# Patient Record
Sex: FEMALE | Race: WHITE | NOT HISPANIC OR LATINO | Employment: FULL TIME | URBAN - METROPOLITAN AREA
[De-identification: names, ages, dates, MRNs, and addresses within clinical notes are randomized per-mention and may not be internally consistent; named-entity substitution may affect disease eponyms.]

---

## 2021-07-26 ENCOUNTER — OFFICE VISIT (OUTPATIENT)
Dept: UROLOGY | Facility: CLINIC | Age: 51
End: 2021-07-26

## 2021-07-26 VITALS — RESPIRATION RATE: 17 BRPM | BODY MASS INDEX: 33.38 KG/M2 | WEIGHT: 170 LBS | HEIGHT: 60 IN

## 2021-07-26 DIAGNOSIS — R31.9 URINARY TRACT INFECTION WITH HEMATURIA, SITE UNSPECIFIED: Primary | ICD-10-CM

## 2021-07-26 DIAGNOSIS — R31.29 MICROSCOPIC HEMATURIA: ICD-10-CM

## 2021-07-26 DIAGNOSIS — N39.0 URINARY TRACT INFECTION WITH HEMATURIA, SITE UNSPECIFIED: Primary | ICD-10-CM

## 2021-07-26 LAB
BILIRUB BLD-MCNC: NEGATIVE MG/DL
CLARITY, POC: CLEAR
COLOR UR: YELLOW
GLUCOSE UR STRIP-MCNC: NEGATIVE MG/DL
KETONES UR QL: NEGATIVE
LEUKOCYTE EST, POC: NEGATIVE
NITRITE UR-MCNC: NEGATIVE MG/ML
PH UR: 8 [PH] (ref 5–8)
PROT UR STRIP-MCNC: NEGATIVE MG/DL
RBC # UR STRIP: ABNORMAL /UL
SP GR UR: 1.01 (ref 1–1.03)
UROBILINOGEN UR QL: NORMAL

## 2021-07-26 PROCEDURE — 81003 URINALYSIS AUTO W/O SCOPE: CPT | Performed by: NURSE PRACTITIONER

## 2021-07-26 PROCEDURE — 87086 URINE CULTURE/COLONY COUNT: CPT | Performed by: NURSE PRACTITIONER

## 2021-07-26 PROCEDURE — 99203 OFFICE O/P NEW LOW 30 MIN: CPT | Performed by: NURSE PRACTITIONER

## 2021-07-26 RX ORDER — OXYBUTYNIN CHLORIDE 5 MG/1
10 TABLET, EXTENDED RELEASE ORAL DAILY
Qty: 30 TABLET | Refills: 11 | Status: SHIPPED | OUTPATIENT
Start: 2021-07-26 | End: 2021-08-27

## 2021-07-26 RX ORDER — SULFAMETHOXAZOLE AND TRIMETHOPRIM 800; 160 MG/1; MG/1
TABLET ORAL
COMMUNITY
Start: 2021-07-07 | End: 2021-08-27

## 2021-07-27 LAB — BACTERIA SPEC AEROBE CULT: NORMAL

## 2021-07-27 NOTE — PROGRESS NOTES
"Chief Complaint  No chief complaint on file.    Subjective          Rosalina Singh presents to [facility]  Patient is a 51-year-old white/ female presents to the urology office with complaints of microscopic materia recurrent UTIs.    Patient admits to urinary frequency and urgency.  Denies any dysuria.    Denies gross hematuria.    Admits to being told that she has had blood in her urine for several years.    Admits to left flank pain.    Admits to some incontinence issues.    Denies any blood thinners.    Admits to being a smoker 15+ years.    Results for orders placed or performed in visit on 07/26/21  -POC Urinalysis Dipstick  Specimen: Urine       Result                      Value             Ref Range           Color                       Yellow            Yellow, Stra*       Clarity, UA                 Clear             Clear               Glucose, UA                 Negative          Negative, 10*       Bilirubin                   Negative          Negative            Ketones, UA                 Negative          Negative            Specific Gravity            1.015             1.005 - 1.030       Blood, UA                   Trace (A)         Negative            pH, Urine                   8.0               5.0 - 8.0           Protein, POC                Negative          Negative mg/*       Urobilinogen, UA            Normal            Normal              Leukocytes                  Negative          Negative            Nitrite, UA                 Negative          Negative           7/21 Renal u/s reveals: normal ultrasound of kidneys and urinary bladder.     Previous cultures:  6/21: Proteus mirabilis.  9/20: proteus mirabilis              Objective   Vital Signs:   Resp 17   Ht 152.4 cm (60\")   Wt 77.1 kg (170 lb)   BMI 33.20 kg/m²     Physical Exam  Constitutional:       Appearance: Normal appearance.   Pulmonary:      Effort: Pulmonary effort is normal.   Abdominal: "      Palpations: Abdomen is soft.      Tenderness: There is abdominal tenderness.   Skin:     General: Skin is warm and dry.   Neurological:      General: No focal deficit present.      Mental Status: She is alert and oriented to person, place, and time.   Psychiatric:         Mood and Affect: Mood normal.         Behavior: Behavior normal.        Result Review :                 Assessment and Plan    Diagnoses and all orders for this visit:    1. Urinary tract infection with hematuria, site unspecified (Primary)  -     POC Urinalysis Dipstick  -     CT Abdomen Pelvis With & Without Contrast; Future  -     Urine Culture - Urine, Urine, Clean Catch; Future  -     oxybutynin XL (DITROPAN-XL) 5 MG 24 hr tablet; Take 2 tablets by mouth Daily.  Dispense: 30 tablet; Refill: 11  -     Urine Culture - Urine, Urine, Clean Catch    2. Microscopic hematuria  -     CT Abdomen Pelvis With & Without Contrast; Future  -     Urine Culture - Urine, Urine, Clean Catch; Future  -     oxybutynin XL (DITROPAN-XL) 5 MG 24 hr tablet; Take 2 tablets by mouth Daily.  Dispense: 30 tablet; Refill: 11  -     Urine Culture - Urine, Urine, Clean Catch        Follow Up   Return for ct scan.  Follow-up with me post imaging..  Patient was given instructions and counseling regarding her condition or for health maintenance advice. Please see specific information pulled into the AVS if appropriate.

## 2021-08-05 ENCOUNTER — HOSPITAL ENCOUNTER (OUTPATIENT)
Dept: CT IMAGING | Facility: HOSPITAL | Age: 51
Discharge: HOME | End: 2021-08-05
Admitting: NURSE PRACTITIONER

## 2021-08-05 DIAGNOSIS — N39.0 URINARY TRACT INFECTION WITH HEMATURIA, SITE UNSPECIFIED: ICD-10-CM

## 2021-08-05 DIAGNOSIS — R31.29 MICROSCOPIC HEMATURIA: ICD-10-CM

## 2021-08-05 DIAGNOSIS — R31.9 URINARY TRACT INFECTION WITH HEMATURIA, SITE UNSPECIFIED: ICD-10-CM

## 2021-08-05 LAB — CREAT BLDA-MCNC: 0.7 MG/DL

## 2021-08-05 PROCEDURE — 74178 CT ABD&PLV WO CNTR FLWD CNTR: CPT

## 2021-08-05 PROCEDURE — 0 IOPAMIDOL PER 1 ML: Performed by: NURSE PRACTITIONER

## 2021-08-05 PROCEDURE — 82565 ASSAY OF CREATININE: CPT

## 2021-08-05 RX ADMIN — IOPAMIDOL 100 ML: 755 INJECTION, SOLUTION INTRAVENOUS at 10:00

## 2021-08-06 ENCOUNTER — TELEPHONE (OUTPATIENT)
Dept: UROLOGY | Facility: CLINIC | Age: 51
End: 2021-08-06

## 2021-08-06 NOTE — TELEPHONE ENCOUNTER
----- Message from JOSE ALEJANDRO Shah sent at 8/5/2021  3:41 PM EDT -----  Ct scan was normal. Patient needs to be scheduled for a cysto with Gamaliel please

## 2021-08-09 NOTE — TELEPHONE ENCOUNTER
Patient called and I told her that the CT is normal.  She wants to know why she needs the cysto, if the CT is normal.  Does April think something else is wrong?  Ruy is calling her to schedule the cysto.

## 2021-08-09 NOTE — TELEPHONE ENCOUNTER
No! Cysto is part of part of hematuria work-up. To complete hematuria work-up she needs the cystoscopy.

## 2021-08-09 NOTE — TELEPHONE ENCOUNTER
I called the patient and told her the cysto is to complete the hematuria work-up, and that it isn't because April thinks something is wrong.

## 2021-08-27 ENCOUNTER — OFFICE VISIT (OUTPATIENT)
Dept: UROLOGY | Facility: CLINIC | Age: 51
End: 2021-08-27

## 2021-08-27 VITALS
WEIGHT: 170 LBS | HEIGHT: 60 IN | SYSTOLIC BLOOD PRESSURE: 124 MMHG | DIASTOLIC BLOOD PRESSURE: 80 MMHG | BODY MASS INDEX: 33.38 KG/M2

## 2021-08-27 DIAGNOSIS — R31.29 MICROSCOPIC HEMATURIA: Primary | ICD-10-CM

## 2021-08-27 DIAGNOSIS — N32.81 OAB (OVERACTIVE BLADDER): ICD-10-CM

## 2021-08-27 LAB
BILIRUB BLD-MCNC: NEGATIVE MG/DL
CLARITY, POC: ABNORMAL
COLOR UR: YELLOW
GLUCOSE UR STRIP-MCNC: NEGATIVE MG/DL
KETONES UR QL: NEGATIVE
LEUKOCYTE EST, POC: NEGATIVE
NITRITE UR-MCNC: NEGATIVE MG/ML
PH UR: 5.5 [PH] (ref 5–8)
PROT UR STRIP-MCNC: NEGATIVE MG/DL
RBC # UR STRIP: ABNORMAL /UL
SP GR UR: 1 (ref 1–1.03)
UROBILINOGEN UR QL: NORMAL

## 2021-08-27 PROCEDURE — 52000 CYSTOURETHROSCOPY: CPT | Performed by: UROLOGY

## 2021-08-27 PROCEDURE — 81003 URINALYSIS AUTO W/O SCOPE: CPT | Performed by: UROLOGY

## 2021-08-27 RX ORDER — TOLTERODINE 4 MG/1
4 CAPSULE, EXTENDED RELEASE ORAL DAILY
Qty: 90 CAPSULE | Refills: 3 | Status: SHIPPED | OUTPATIENT
Start: 2021-08-27 | End: 2022-08-22

## 2021-08-27 RX ORDER — DICLOFENAC SODIUM 75 MG/1
75 TABLET, DELAYED RELEASE ORAL 2 TIMES DAILY
COMMUNITY
Start: 2021-08-17

## 2021-08-27 RX ORDER — INDOMETHACIN 50 MG/1
CAPSULE ORAL
COMMUNITY
Start: 2021-06-30 | End: 2023-01-25

## 2021-08-27 RX ORDER — FLUCONAZOLE 150 MG/1
TABLET ORAL
COMMUNITY
Start: 2021-07-07 | End: 2023-01-25

## 2021-08-27 RX ORDER — CIPROFLOXACIN 500 MG/1
TABLET, FILM COATED ORAL
COMMUNITY
Start: 2021-08-20 | End: 2023-01-25

## 2021-08-27 NOTE — PROGRESS NOTES
Cystoscopy    Date/Time: 8/27/2021 12:41 PM  Performed by: Rosanna Alston MD  Authorized by: Rosanna Alston MD   Preparation: Patient was prepped and draped in the usual sterile fashion.  Local anesthesia used: no    Anesthesia:  Local anesthesia used: no    Sedation:  Patient sedated: no    Patient tolerance: patient tolerated the procedure well with no immediate complications  Comments: Cytoscopy Procedure:     Procedure: Flexible cytoscope was passed per urethra into the bladder without difficulty after proper consent. The bladder was inspected in a systematic meridian fashion. There were no tumors, lesions, stones, or other abnormalities noted within the bladder. Of note, there was no increased vascularity as well. Both ureteral orifices were identified and were normal in appearance. The flexible cytoscope was removed. The patient tolerated the procedure well.     FOLLOW UP OFFICE NOTE: The CT scan of the Abdomen/Pelvis was normal.

## 2023-01-25 ENCOUNTER — OFFICE VISIT (OUTPATIENT)
Dept: UROLOGY | Facility: CLINIC | Age: 53
End: 2023-01-25
Payer: COMMERCIAL

## 2023-01-25 VITALS — WEIGHT: 170 LBS | HEIGHT: 60 IN | BODY MASS INDEX: 33.38 KG/M2

## 2023-01-25 DIAGNOSIS — N39.0 RECURRENT UTI: ICD-10-CM

## 2023-01-25 DIAGNOSIS — R31.29 MICROSCOPIC HEMATURIA: ICD-10-CM

## 2023-01-25 DIAGNOSIS — N95.2 ATROPHIC VAGINITIS: Primary | ICD-10-CM

## 2023-01-25 PROBLEM — M54.9 CHRONIC BACK PAIN: Status: ACTIVE | Noted: 2021-10-15

## 2023-01-25 PROBLEM — R91.8 MULTIPLE PULMONARY NODULES: Status: ACTIVE | Noted: 2021-11-18

## 2023-01-25 PROBLEM — G89.29 CHRONIC BACK PAIN: Status: ACTIVE | Noted: 2021-10-15

## 2023-01-25 LAB
BILIRUB BLD-MCNC: NEGATIVE MG/DL
CLARITY, POC: CLEAR
COLOR UR: YELLOW
EXPIRATION DATE: 424
GLUCOSE UR STRIP-MCNC: NEGATIVE MG/DL
KETONES UR QL: NEGATIVE
LEUKOCYTE EST, POC: NEGATIVE
Lab: ABNORMAL
NITRITE UR-MCNC: NEGATIVE MG/ML
PH UR: 7 [PH] (ref 5–8)
PROT UR STRIP-MCNC: NEGATIVE MG/DL
RBC # UR STRIP: ABNORMAL /UL
SP GR UR: 1.01 (ref 1–1.03)
URINE VOLUME: 0
UROBILINOGEN UR QL: ABNORMAL

## 2023-01-25 PROCEDURE — 81003 URINALYSIS AUTO W/O SCOPE: CPT | Performed by: NURSE PRACTITIONER

## 2023-01-25 PROCEDURE — 99214 OFFICE O/P EST MOD 30 MIN: CPT | Performed by: NURSE PRACTITIONER

## 2023-01-25 PROCEDURE — 51798 US URINE CAPACITY MEASURE: CPT | Performed by: NURSE PRACTITIONER

## 2023-01-25 RX ORDER — FLUTICASONE PROPIONATE 50 MCG
SPRAY, SUSPENSION (ML) NASAL
COMMUNITY

## 2023-01-25 RX ORDER — ESTRADIOL 0.1 MG/G
CREAM VAGINAL
Qty: 42.5 G | Refills: 6 | Status: SHIPPED | OUTPATIENT
Start: 2023-01-25

## 2023-01-25 NOTE — PROGRESS NOTES
"Chief Complaint: No chief complaint on file.    Subjective         History of Present Illness  Rosalina Singh is a 52 y.o. female presents to [facility] to be seen for recurrent UTI.    Patient presents reporting she is getting UTI about 1 time per month. She reports she is not having burning with urination, she only has back pain and leg pain. She is then treated with antibiotics.     Hx of hysterectomy    No  surgeries    Denies any /breast cancers.    Denies symptoms today    Drinking 6 bottles of water daily, occasional caffeine    Nocturia x 2    Voiding 3-4 times per day    No family hx of  malignancies    No hx of nephrolithiasis    Urine cultures:    10/20/22  >100,000 cfu/ml yeast     8/22/2022  >100,000 cfu/ml multiple organisms    Objective     Past Medical History:   Diagnosis Date   • Urinary tract infection        Past Surgical History:   Procedure Laterality Date   • APPENDECTOMY      age 9   • HYSTERECTOMY  2011         Current Outpatient Medications:   •  diclofenac (VOLTAREN) 75 MG EC tablet, Take 75 mg by mouth 2 (Two) Times a Day., Disp: , Rfl:   •  estradiol (ESTRACE) 0.1 MG/GM vaginal cream, Apply 1 gm vaginally three time per week, Disp: 42.5 g, Rfl: 6  •  fluticasone (FLONASE) 50 MCG/ACT nasal spray, fluticasone propionate 50 mcg/actuation nasal spray,suspension  USE 1 SPRAY(S) IN EACH NOSTRIL ONCE DAILY, Disp: , Rfl:     No Known Allergies     History reviewed. No pertinent family history.    Social History     Socioeconomic History   • Marital status:    Tobacco Use   • Smoking status: Every Day     Packs/day: 1.00     Types: Cigarettes   • Smokeless tobacco: Never   Vaping Use   • Vaping Use: Some days   • Substances: Nicotine       Vital Signs:   Ht 152.4 cm (60\")   Wt 77.1 kg (170 lb)   BMI 33.20 kg/m²      Physical Exam     Result Review :   The following data was reviewed by: JOSE ALEJANDRO Gill on 01/25/2023:  Results for orders placed " or performed in visit on 01/25/23   Bladder Scan   Result Value Ref Range    Urine Volume 0    POC Urinalysis Dipstick, Automated    Specimen: Urine   Result Value Ref Range    Color Yellow Yellow, Straw, Dark Yellow, Liane    Clarity, UA Clear Clear    Specific Gravity  1.010 1.005 - 1.030    pH, Urine 7.0 5.0 - 8.0    Leukocytes Negative Negative    Nitrite, UA Negative Negative    Protein, POC Negative Negative mg/dL    Glucose, UA Negative Negative mg/dL    Ketones, UA Negative Negative    Urobilinogen, UA 0.2 E.U./dL Normal, 0.2 E.U./dL    Bilirubin Negative Negative    Blood, UA Small (A) Negative    Lot Number [id number]     Expiration Date 424       Results for orders placed or performed in visit on 01/25/23   Bladder Scan   Result Value Ref Range    Urine Volume 0    POC Urinalysis Dipstick, Automated    Specimen: Urine   Result Value Ref Range    Color Yellow Yellow, Straw, Dark Yellow, Liane    Clarity, UA Clear Clear    Specific Gravity  1.010 1.005 - 1.030    pH, Urine 7.0 5.0 - 8.0    Leukocytes Negative Negative    Nitrite, UA Negative Negative    Protein, POC Negative Negative mg/dL    Glucose, UA Negative Negative mg/dL    Ketones, UA Negative Negative    Urobilinogen, UA 0.2 E.U./dL Normal, 0.2 E.U./dL    Bilirubin Negative Negative    Blood, UA Small (A) Negative    Lot Number [id number]     Expiration Date 424         Bladder Scan interpretation 01/25/2023    Estimation of residual urine via BVI 3000 Verathon Bladder Scan  MA/nurse performing: Naima RICKS RN  Residual Urine: 0 ml  Indication: Atrophic vaginitis    Microscopic hematuria    Recurrent UTI   Position: Supine  Examination: Incremental scanning of the suprapubic area using 2.0 MHz transducer using copious amounts of acoustic gel.   Findings: An anechoic area was demonstrated which represented the bladder, with measurement of residual urine as noted. I inspected this myself. In that the residual urine  insignificant, refer to plan for treatment  and plan necessary at this time.             Procedures        Assessment and Plan    Diagnoses and all orders for this visit:    1. Atrophic vaginitis (Primary)  -     estradiol (ESTRACE) 0.1 MG/GM vaginal cream; Apply 1 gm vaginally three time per week  Dispense: 42.5 g; Refill: 6    2. Microscopic hematuria  -     POC Urinalysis Dipstick, Automated    3. Recurrent UTI  -     Bladder Scan  -     POC Urinalysis Dipstick, Automated      Urinary tract infection-no evidence on urine dip of infection today.  Discussed with patient that chronic recurrent UTI is a common condition that is multifactorial in nature, difficult to treat, unlikely to completely irradicate, and the specific cause for recurrent infections is often unknown.     Discussed that urine cultures are critically important for the diagnosis of recurrent urinary tract infection.  Discussed that some women with history of vaginal atrophy will have concomitant genitourinary syndrome of menopause, cystitis symptoms, without bacteriuria.     Discussed that one of the best treatment and prevention medications for recurrent urinary tract infection and  syndrome of menopause is vaginal estrogen cream.   Premarin cream sent to pharmacy.  Specific instructions provided regarding placement of cream at the urethra and urethral meatus 3 times weekly.  Blackbox warning discussed.  Sent to pharmacy     Should antibiotic strategy for treatment and prevention of recurrent urinary tract infection be pursued, aware that trend of urine cultures needs to be performed.  She is agreeable and understands this.     Encouraged behavioral modifications including fluid management, hydration, not delaying urgency when she needs to void.  Recommend cranberry supplementation daily to aid with prevention of urinary tract infection.  Discussed over-the-counter herbal supplements for prevention of UTI including [place].  Patient provided information on these products and encouraged to  investigate further.     At this point, recommend the above and on demand abx when culture positive  Discussed the importance of antibiotic stewardship; the prevalence of resistant bacteria; and adverse effects of prolonged antibiotic treatment including yeast infections and possible C. difficile colitis; thus would recommend continued use of on-demand antibiotics per culture sensitivities.  Patient to notify office as UTI symptoms arise so culture may be obtained.  Patient aware that antibiotic will only be ordered if urine culture positive.        Follow-up 3 months or earlier as needed           Follow Up   Return in about 3 months (around 4/25/2023) for recurrent UTI.  Patient was given instructions and counseling regarding her condition or for health maintenance advice. Please see specific information pulled into the AVS if appropriate.         This document has been electronically signed by JOSE ALEJANDRO Gill  January 25, 2023 08:39 EST

## 2023-05-19 ENCOUNTER — TELEPHONE (OUTPATIENT)
Dept: UROLOGY | Facility: CLINIC | Age: 53
End: 2023-05-19